# Patient Record
Sex: MALE | Race: BLACK OR AFRICAN AMERICAN | NOT HISPANIC OR LATINO | Employment: UNEMPLOYED | URBAN - METROPOLITAN AREA
[De-identification: names, ages, dates, MRNs, and addresses within clinical notes are randomized per-mention and may not be internally consistent; named-entity substitution may affect disease eponyms.]

---

## 2019-02-02 ENCOUNTER — HOSPITAL ENCOUNTER (EMERGENCY)
Facility: HOSPITAL | Age: 29
Discharge: HOME | End: 2019-02-02
Attending: EMERGENCY MEDICINE
Payer: MEDICAID

## 2019-02-02 VITALS
HEART RATE: 88 BPM | DIASTOLIC BLOOD PRESSURE: 96 MMHG | BODY MASS INDEX: 25.76 KG/M2 | RESPIRATION RATE: 17 BRPM | HEIGHT: 68 IN | SYSTOLIC BLOOD PRESSURE: 144 MMHG | OXYGEN SATURATION: 100 % | WEIGHT: 170 LBS

## 2019-02-02 DIAGNOSIS — F41.0 ANXIETY ATTACK: Primary | ICD-10-CM

## 2019-02-02 DIAGNOSIS — R06.02 SHORTNESS OF BREATH: ICD-10-CM

## 2019-02-02 DIAGNOSIS — T59.91XA INHALATION OF NOXIOUS FUMES, ACCIDENTAL OR UNINTENTIONAL, INITIAL ENCOUNTER: ICD-10-CM

## 2019-02-02 PROCEDURE — 99283 EMERGENCY DEPT VISIT LOW MDM: CPT

## 2019-02-02 NOTE — ED NOTES
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 Pt inhaled grain dust and became short of breath. Pt arrived hyperventilating. Dr. Saunders and ABDULLAHI Angel at bedside guiding pt to breath calmly

## 2019-02-02 NOTE — ED PROVIDER NOTES
Encounter Date: 2/2/2019    SCRIBE #1 NOTE: I, Marc Garcia, am scribing for, and in the presence of,  Dr. Saunders. I have scribed the entire note.       History     Chief Complaint   Patient presents with    Shortness of Breath     States 1 hr onset SOB with no pain while at work. Presents hyperventilating with good air exchange. Slightly diaphoretic. no h/o injury. No evidence trauma. Able to complete sentences.      This is a 28 y.o. male who has no past medical history on file.     The patient presents to the Emergency Department due to shortness of breath.  Upon arrival, patient needed emergent bedside evaluation.  Pt denies chest pain, palpitations, abdominal pain, or history of Asthma.  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  He states he is unsure if he inhaled a chemical at work, he thinks he inhaled dust.  He stated his symptoms started yesterday.  He reports nausea with episodes of vomiting.  He states he used his inhaler yesterday afternoon after getting off work and this morning before going to work.  He notes he smokes cigarettes.      The history is provided by the patient.     Review of patient's allergies indicates:  No Known Allergies  History reviewed. No pertinent past medical history.  History reviewed. No pertinent surgical history.  History reviewed. No pertinent family history.  Social History     Tobacco Use    Smoking status: Current Some Day Smoker   Substance Use Topics    Alcohol use: No     Frequency: Never    Drug use: Not on file     Review of Systems   Constitutional: Positive for diaphoresis. Negative for chills and fever.   HENT: Negative for congestion, ear pain, rhinorrhea and sore throat.    Respiratory: Positive for shortness of breath. Negative for cough and wheezing.    Cardiovascular: Negative for chest pain and palpitations.   Gastrointestinal: Positive for nausea and vomiting. Negative for abdominal pain and diarrhea.   Genitourinary: Negative for dysuria and  hematuria.   Musculoskeletal: Negative for back pain, myalgias and neck pain.   Skin: Negative for rash.   Neurological: Negative for dizziness, weakness, light-headedness and headaches.   Psychiatric/Behavioral: Negative for confusion.       Physical Exam     Initial Vitals [02/02/19 1027]   BP Pulse Resp Temp SpO2   (!) 199/98 (!) 115 (!) 36 -- 100 %      MAP       --         Physical Exam    Nursing note and vitals reviewed.  Constitutional: He appears well-developed and well-nourished. He is diaphoretic. He appears distressed.   HENT:   Head: Normocephalic and atraumatic.   Right Ear: External ear normal.   Left Ear: External ear normal.   Mouth/Throat: Oropharynx is clear and moist. No oropharyngeal exudate.   Eyes: Conjunctivae and EOM are normal. Pupils are equal, round, and reactive to light.   Conjunctival injection   Neck: Normal range of motion. Neck supple.   Cardiovascular: Normal rate, regular rhythm, normal heart sounds and intact distal pulses.   No murmur heard.  Pulmonary/Chest: He is in respiratory distress. He has no wheezes. He has no rhonchi. He has no rales.   Oral airway normal   Abdominal: Soft. Bowel sounds are normal. There is no tenderness. There is no rebound and no guarding.   Musculoskeletal: Normal range of motion. He exhibits no edema or tenderness.   Lymphadenopathy:     He has no cervical adenopathy.   Neurological: He is alert and oriented to person, place, and time. He has normal strength.   Skin: Skin is warm.   Psychiatric:   Anxious.         ED Course   Procedures  Labs Reviewed - No data to display       Imaging Results          X-Ray Chest AP Portable (Final result)  Result time 02/02/19 11:02:07    Final result by Conchita Shane MD (02/02/19 11:02:07)                 Impression:      No acute abnormality.      Electronically signed by: Conchita Shane MD  Date:    02/02/2019  Time:    11:02             Narrative:    EXAMINATION:  XR CHEST AP PORTABLE    CLINICAL  HISTORY:  Shortness of breath    TECHNIQUE:  Single frontal view of the chest was performed.    COMPARISON:  None    FINDINGS:  The lungs are clear, with normal appearance of pulmonary vasculature and no pleural effusion or pneumothorax.    The cardiac silhouette is normal in size. The hilar and mediastinal contours are unremarkable.    Bones are intact.                              X-Rays:   Independently Interpreted Readings:   Chest X-Ray: CXR: This is an AP Portable chest exam with adequate penetration, positioning and good air entry. Trachea is midline, cardiac and mediastinal silhouettes are WNL. There are no infiltrates, consolidations or effusions. Impression: Normal. This exam was independently interpreted by me.       Medical Decision Making:   Clinical Tests:   Radiological Study: Ordered and Reviewed                   ED Course as of Feb 02 1053   Sat Feb 02, 2019   1041 I, Dr. Axel Saunders, personally performed the services described in this documentation.   All medical record entries made by the scribe were at my direction and in my presence.   I have reviewed the chart and agree that the record is accurate and complete.   Axel Saunders MD.    [NP]   1041 This is an emergent evaluation of a 28 y.o.male patient with presentation of shortness of breath after potentially inhaling something at work.  Improved after breathing into emesis bag.  Initial differentials include but are not limited to:  Anxiety attack, and elevation injury, pneumothorax, other lung pathology not otherwise specified.   Plan:  Chest x-ray, continuous pulse ox, cardiac monitoring.    Also of note, blood pressure and heart rate improved as well as respiratory rate upon reassurance and breathing exercises    [NP]      ED Course User Index  [NP] Axel Saunders MD     11:23 AM Patient's Chest Xray was unremarkable. Patient's vitals improved. Patient's heart rate was in the 70's, BP of 140/80, and Saturation of 100%. Patient was resting  comfortably during reevaluation. Patient is stable for discharge, patient can return to work tomorrow.      Clinical Impression:     1. Anxiety attack    2. Shortness of breath    3. Inhalation of noxious fumes, accidental or unintentional, initial encounter            Disposition:   Disposition: Discharged  Condition: Stable                       Axel Saunders MD  02/09/19 0653

## 2019-02-02 NOTE — DISCHARGE INSTRUCTIONS
Thank you for choosing [facility]! We appreciate you coming to us for your medical care. We hope you feel better soon! Please come back to [facility] for all of your future medical needs.    Our goal in the emergency department is to always give you outstanding care and exceptional service. You may receive a survey by mail or e-mail in the next week regarding your experience in our ED. We would greatly appreciate your completing and returning the survey. Your feedback provides us with a way to recognize our staff who give very good care and it helps us learn how to improve when your experience was below our aspiration of excellence.       Sincerely,    Axel Saunders MD  Medical Director  Emergency Department  [facility]